# Patient Record
Sex: FEMALE | Race: BLACK OR AFRICAN AMERICAN | NOT HISPANIC OR LATINO | Employment: FULL TIME | URBAN - METROPOLITAN AREA
[De-identification: names, ages, dates, MRNs, and addresses within clinical notes are randomized per-mention and may not be internally consistent; named-entity substitution may affect disease eponyms.]

---

## 2023-06-15 RX ORDER — DOXYCYCLINE HYCLATE 100 MG
100 TABLET ORAL 2 TIMES DAILY
Qty: 28 TABLET | Refills: 0 | Status: SHIPPED | OUTPATIENT
Start: 2023-06-15 | End: 2023-06-29

## 2023-06-15 RX ORDER — DOXYCYCLINE HYCLATE 100 MG
100 TABLET ORAL 2 TIMES DAILY
Qty: 28 TABLET | Refills: 0 | Status: CANCELLED | OUTPATIENT
Start: 2023-06-15 | End: 2023-06-29

## 2025-01-03 DIAGNOSIS — N20.0 KIDNEY STONE: Primary | ICD-10-CM

## 2025-01-15 ENCOUNTER — OFFICE VISIT (OUTPATIENT)
Dept: UROLOGY | Facility: CLINIC | Age: 35
End: 2025-01-15
Payer: MEDICAID

## 2025-01-15 DIAGNOSIS — N20.0 KIDNEY STONE: ICD-10-CM

## 2025-01-15 DIAGNOSIS — R10.9 RIGHT FLANK PAIN: ICD-10-CM

## 2025-01-15 DIAGNOSIS — R31.29 HEMATURIA, MICROSCOPIC: Primary | ICD-10-CM

## 2025-01-15 DIAGNOSIS — R10.9 ABDOMINAL PAIN, UNSPECIFIED ABDOMINAL LOCATION: ICD-10-CM

## 2025-01-15 LAB
BILIRUBIN, UA POC OHS: NEGATIVE
BLOOD, UA POC OHS: ABNORMAL
CLARITY, UA POC OHS: CLEAR
COLOR, UA POC OHS: YELLOW
GLUCOSE, UA POC OHS: NEGATIVE
KETONES, UA POC OHS: NEGATIVE
LEUKOCYTES, UA POC OHS: NEGATIVE
NITRITE, UA POC OHS: NEGATIVE
PH, UA POC OHS: 6
PROTEIN, UA POC OHS: 100
SPECIFIC GRAVITY, UA POC OHS: 1.02
UROBILINOGEN, UA POC OHS: 0.2

## 2025-01-15 PROCEDURE — 81003 URINALYSIS AUTO W/O SCOPE: CPT | Mod: QW,S$GLB,, | Performed by: NURSE PRACTITIONER

## 2025-01-15 PROCEDURE — 1160F RVW MEDS BY RX/DR IN RCRD: CPT | Mod: CPTII,S$GLB,, | Performed by: NURSE PRACTITIONER

## 2025-01-15 PROCEDURE — 99204 OFFICE O/P NEW MOD 45 MIN: CPT | Mod: S$GLB,,, | Performed by: NURSE PRACTITIONER

## 2025-01-15 PROCEDURE — 1159F MED LIST DOCD IN RCRD: CPT | Mod: CPTII,S$GLB,, | Performed by: NURSE PRACTITIONER

## 2025-01-15 RX ORDER — CYCLOSPORINE 0.5 MG/ML
EMULSION OPHTHALMIC
COMMUNITY
Start: 2024-11-25

## 2025-01-15 NOTE — PROGRESS NOTES
Subjective:       Patient ID: Madelyn Carvalho is a 34 y.o. female.    Chief Complaint: Nephrolithiasis      HPI: 34-year-old female, new to [facility], presents with complaint of kidney stone.    Patient has 2 charts.  Other chart is under the name of Madelyn Carvalho with medical record number [id number]  In August the patient started having right flank pain.  Went to the emergency room.  She had a CT done showed a 9-10 mm stone in the right renal pelvis stone some dilation of the renal pelvis and calyces.  Patient states he has off and on episodes of pain.  At this present time her pain is 0/10.  With the pain is it is worse, it is 10/10.  She describes the pain as a shooting pain.    She denies any pain or burning urination.  Denies any difficulty voiding.  Denies having strain to void.  Denies any significant frequency, urgency, or nocturia.  Denies seeing any blood in urine.    She does have a history of gout.      No other urinary complaints at this time.       Past Medical History: History reviewed. No pertinent past medical history.    Past Surgical Historical: History reviewed. No pertinent surgical history.     Medications:   Medication List with Changes/Refills   Current Medications    CYCLOSPORINE (RESTASIS) 0.05 % OPHTHALMIC EMULSION            Past Social History:   Social History     Socioeconomic History    Marital status:    Tobacco Use    Smoking status: Every Day     Types: Vaping with nicotine    Smokeless tobacco: Never       Allergies: Review of patient's allergies indicates:  No Known Allergies     Family History: No family history on file.     Review of Systems:  Review of Systems   Constitutional:  Negative for activity change and appetite change.   HENT:  Negative for congestion and dental problem.    Respiratory:  Negative for chest tightness and shortness of breath.    Cardiovascular:  Negative for chest pain.   Gastrointestinal:  Negative for abdominal distention and abdominal pain.    Genitourinary:  Positive for flank pain. Negative for decreased urine volume, difficulty urinating, dyspareunia, dysuria, enuresis, frequency, genital sores, hematuria, pelvic pain and urgency.   Musculoskeletal:  Negative for back pain and neck pain.   Allergic/Immunologic: Negative for immunocompromised state.   Neurological:  Negative for dizziness.   Hematological:  Negative for adenopathy.   Psychiatric/Behavioral:  Negative for agitation, behavioral problems and confusion.        Physical Exam:  Physical Exam  Vitals and nursing note reviewed.   Constitutional:       Appearance: She is well-developed.   HENT:      Head: Normocephalic.   Eyes:      Pupils: Pupils are equal, round, and reactive to light.   Cardiovascular:      Rate and Rhythm: Normal rate and regular rhythm.      Heart sounds: Normal heart sounds.   Pulmonary:      Effort: Pulmonary effort is normal.      Breath sounds: Normal breath sounds.   Abdominal:      General: Bowel sounds are normal.      Palpations: Abdomen is soft.   Musculoskeletal:         General: Normal range of motion.      Cervical back: Normal range of motion and neck supple.   Skin:     General: Skin is warm and dry.   Neurological:      Mental Status: She is alert and oriented to person, place, and time.   Psychiatric:         Mood and Affect: Mood normal.         Behavior: Behavior normal.     Urinalysis:  Trace intact blood, red blood cell 6-10.    Assessment/Plan:   Kidney stone/right flank pain/microscopic hematuria:  CT in August showed a 9-10 mm stone in the right UPJ.    Patient has not seen a stone pass.  She has off and on episodes of right flank pain.    Will repeat CT.  We will notify patient of results.      Follow-up to be arranged.  Problem List Items Addressed This Visit    None  Visit Diagnoses       Hematuria, microscopic    -  Primary    Relevant Orders    CT Abdomen Pelvis  Without Contrast    Kidney stone        Relevant Orders    CT Abdomen Pelvis   Without Contrast    POCT Urinalysis(Instrument)    Abdominal pain, unspecified abdominal location        Relevant Orders    CT Abdomen Pelvis  Without Contrast    Right flank pain        Relevant Orders    POCT Urinalysis(Instrument)

## 2025-01-24 ENCOUNTER — TELEPHONE (OUTPATIENT)
Dept: UROLOGY | Facility: CLINIC | Age: 35
End: 2025-01-24
Payer: MEDICAID

## 2025-01-24 ENCOUNTER — HOSPITAL ENCOUNTER (OUTPATIENT)
Dept: RADIOLOGY | Facility: HOSPITAL | Age: 35
Discharge: HOME | End: 2025-01-24
Attending: NURSE PRACTITIONER
Payer: MEDICAID

## 2025-01-24 DIAGNOSIS — N20.0 KIDNEY STONE: Primary | ICD-10-CM

## 2025-01-24 PROCEDURE — 74176 CT ABD & PELVIS W/O CONTRAST: CPT | Mod: TC

## 2025-01-24 NOTE — TELEPHONE ENCOUNTER
Attempted to notify patient of CT results showing an 8 mm non obstructing renal pelvis stone & that she is a candidate for right ESWL or URS. No answer, left message to call back to discuss results & plan of care. Number given.             ----- Message from Trey Cee NP sent at 1/24/2025  1:00 PM CST -----  CT shows a 8 mm stone in the right renal pelvis.  Patient is a candidate for right ESWL or URS.

## 2025-01-24 NOTE — TELEPHONE ENCOUNTER
Returned request for call back. Informed patient of CT results showing an 8 mm stone in the right renal pelvis & that she is a candidate for a right ESWL or URS. Explained each procedure in depth. Patient has chosen to do URS due to the amount of time that the stone has been present. Surgery date set for Wednesday, February 5, 2025 & nurse visit set for Wednesday, January 29, 2025 at 1 PM to sign consents. Surgery order for right URS placed.                ----- Message from Trey Cee NP sent at 1/24/2025  1:00 PM CST -----  CT shows a 8 mm stone in the right renal pelvis.  Patient is a candidate for right ESWL or URS.

## 2025-01-24 NOTE — TELEPHONE ENCOUNTER
Returned request for call back. Patient stated that instead of having a URS done she would like to proceed with right ESWL instead. Order for right URS cancelled & new order for right ESWL placed. New surgery date is Wednesday, February 12, 2025. Verbalized understanding.                 ----- Message from Evelyn sent at 1/24/2025  4:29 PM CST -----  Patient requesting a call back please call her back at [phone]

## 2025-01-29 ENCOUNTER — CLINICAL SUPPORT (OUTPATIENT)
Dept: UROLOGY | Facility: CLINIC | Age: 35
End: 2025-01-29
Payer: MEDICAID

## 2025-01-29 DIAGNOSIS — N20.0 KIDNEY STONE: Primary | ICD-10-CM

## 2025-01-29 NOTE — PROGRESS NOTES
Updated medical, surgical and family history.    Completed social history.    Consents and education completed for ESWL right at [facility] foe kidney stone on 2/12/25. Pt given highlighted written instructions/information after I verbally went over them with her, copy of faxed pre op order and medication list. Pt lives out of town therefore I advised she could go do pre op today. Provided clinic phone number for any concerns or questions, before or after procedure. Pt states understanding of all the above.